# Patient Record
Sex: FEMALE
[De-identification: names, ages, dates, MRNs, and addresses within clinical notes are randomized per-mention and may not be internally consistent; named-entity substitution may affect disease eponyms.]

---

## 2022-03-20 ENCOUNTER — TRANSCRIPTION ENCOUNTER (OUTPATIENT)
Age: 32
End: 2022-03-20

## 2023-07-27 PROBLEM — Z00.00 ENCOUNTER FOR PREVENTIVE HEALTH EXAMINATION: Status: ACTIVE | Noted: 2023-07-27

## 2023-08-03 ENCOUNTER — APPOINTMENT (OUTPATIENT)
Dept: ORTHOPEDIC SURGERY | Facility: CLINIC | Age: 33
End: 2023-08-03

## 2023-08-14 ENCOUNTER — TRANSCRIPTION ENCOUNTER (OUTPATIENT)
Age: 33
End: 2023-08-14

## 2023-08-14 ENCOUNTER — APPOINTMENT (OUTPATIENT)
Dept: ORTHOPEDIC SURGERY | Facility: CLINIC | Age: 33
End: 2023-08-14
Payer: COMMERCIAL

## 2023-08-14 VITALS — BODY MASS INDEX: 22.71 KG/M2 | WEIGHT: 133 LBS | HEIGHT: 64 IN

## 2023-08-14 DIAGNOSIS — Z78.9 OTHER SPECIFIED HEALTH STATUS: ICD-10-CM

## 2023-08-14 DIAGNOSIS — M23.92 UNSPECIFIED INTERNAL DERANGEMENT OF LEFT KNEE: ICD-10-CM

## 2023-08-14 PROCEDURE — 99203 OFFICE O/P NEW LOW 30 MIN: CPT

## 2023-08-14 PROCEDURE — 73564 X-RAY EXAM KNEE 4 OR MORE: CPT | Mod: LT

## 2023-08-14 RX ORDER — MELOXICAM 15 MG/1
15 TABLET ORAL DAILY
Qty: 30 | Refills: 0 | Status: COMPLETED | COMMUNITY
Start: 2023-08-14 | End: 2023-09-13

## 2023-08-14 NOTE — HISTORY OF PRESENT ILLNESS
[Gradual] : gradual [5] : 5 [0] : 0 [Localized] : localized [Sharp] : sharp [Frequent] : frequent [Leisure] : leisure [Work] : work [Ice] : ice [Exercising] : exercising [Full time] : Work status: full time [de-identified] : 33  year old patient is here for an evaluation for the L knee. States the end of May while on a long run. She took a break from running for a couple of weeks. Intermittent pain persists. States the pain is sharp. Runs about 15-20 miles a week. [] : no [FreeTextEntry1] : L knee [FreeTextEntry9] : Advil [de-identified] : bending, striking

## 2023-08-14 NOTE — DISCUSSION/SUMMARY
[de-identified] : General Dx discussion The patient was advised of the diagnosis. The natural history of the pathology was explained in full to the patient in layman's terms. All questions were answered. The risks and benefits of surgical and non-surgical treatment alternatives were explained in full to the patient.   Case discussed. She will be sent for an MRI to r/o a MMT / stress fracture C. Ice and mobic as needed. Follow up after MRI.  Entered by KIM Rico acting as scribe. - The documentation recorded by the scribe accurately reflects the service I personally performed and the decisions made by me.

## 2023-08-14 NOTE — IMAGING
[Left] : left knee [All Views] : anteroposterior, lateral, skyline, and anteroposterior standing [FreeTextEntry9] : mild medial compartment narrowing

## 2023-08-14 NOTE — PHYSICAL EXAM
[Left] : left knee [5___] : hamstring 5[unfilled]/5 [Equivocal] : equivocal Bela [] : non-antalgic [TWNoteComboBox7] : flexion 130 degrees [de-identified] : extension 0 degrees

## 2023-08-18 ENCOUNTER — APPOINTMENT (OUTPATIENT)
Dept: MRI IMAGING | Facility: CLINIC | Age: 33
End: 2023-08-18
Payer: COMMERCIAL

## 2023-08-18 PROCEDURE — 73721 MRI JNT OF LWR EXTRE W/O DYE: CPT | Mod: LT

## 2023-08-24 ENCOUNTER — APPOINTMENT (OUTPATIENT)
Dept: ORTHOPEDIC SURGERY | Facility: CLINIC | Age: 33
End: 2023-08-24
Payer: COMMERCIAL

## 2023-08-24 VITALS — WEIGHT: 133 LBS | BODY MASS INDEX: 22.71 KG/M2 | HEIGHT: 64 IN

## 2023-08-24 DIAGNOSIS — M70.50 OTHER BURSITIS OF KNEE, UNSPECIFIED KNEE: ICD-10-CM

## 2023-08-24 DIAGNOSIS — M22.2X2 PATELLOFEMORAL DISORDERS, LEFT KNEE: ICD-10-CM

## 2023-08-24 DIAGNOSIS — M22.42 CHONDROMALACIA PATELLAE, LEFT KNEE: ICD-10-CM

## 2023-08-24 PROCEDURE — 99214 OFFICE O/P EST MOD 30 MIN: CPT

## 2023-08-24 NOTE — PHYSICAL EXAM
[Left] : left knee [5___] : hamstring 5[unfilled]/5 [Equivocal] : equivocal Bela [] : non-antalgic [TWNoteComboBox7] : flexion 130 degrees [de-identified] : extension 0 degrees

## 2023-08-24 NOTE — DISCUSSION/SUMMARY
[de-identified] : General Dx discussion The patient was advised of the diagnosis. The natural history of the pathology was explained in full to the patient in layman's terms. All questions were answered. The risks and benefits of surgical and non-surgical treatment alternatives were explained in full to the patient.   Case discussed. mri reviewed  will get PT jelani brae and try glucos/chond  f/u 3 months

## 2023-11-16 ENCOUNTER — APPOINTMENT (OUTPATIENT)
Dept: ORTHOPEDIC SURGERY | Facility: CLINIC | Age: 33
End: 2023-11-16